# Patient Record
Sex: MALE | Race: WHITE | URBAN - METROPOLITAN AREA
[De-identification: names, ages, dates, MRNs, and addresses within clinical notes are randomized per-mention and may not be internally consistent; named-entity substitution may affect disease eponyms.]

---

## 2022-08-23 ENCOUNTER — OFFICE VISIT (OUTPATIENT)
Dept: PHYSICAL MEDICINE AND REHAB | Facility: CLINIC | Age: 52
End: 2022-08-23
Payer: COMMERCIAL

## 2022-08-23 ENCOUNTER — HOSPITAL ENCOUNTER (OUTPATIENT)
Dept: GENERAL RADIOLOGY | Facility: HOSPITAL | Age: 52
Discharge: HOME | End: 2022-08-23
Attending: PHYSICAL MEDICINE & REHABILITATION
Payer: COMMERCIAL

## 2022-08-23 VITALS
DIASTOLIC BLOOD PRESSURE: 86 MMHG | OXYGEN SATURATION: 99 % | HEIGHT: 70 IN | HEART RATE: 81 BPM | BODY MASS INDEX: 27.2 KG/M2 | SYSTOLIC BLOOD PRESSURE: 142 MMHG | WEIGHT: 190 LBS

## 2022-08-23 DIAGNOSIS — M54.16 LUMBAR RADICULOPATHY, RIGHT: ICD-10-CM

## 2022-08-23 DIAGNOSIS — M54.16 LUMBAR RADICULOPATHY, RIGHT: Primary | ICD-10-CM

## 2022-08-23 PROBLEM — F41.8 DEPRESSION WITH ANXIETY: Status: ACTIVE | Noted: 2021-06-30

## 2022-08-23 PROBLEM — I10 ESSENTIAL HYPERTENSION, BENIGN: Status: ACTIVE | Noted: 2020-09-23

## 2022-08-23 PROBLEM — E78.2 MIXED HYPERLIPIDEMIA: Status: ACTIVE | Noted: 2020-09-23

## 2022-08-23 PROBLEM — B35.1 TOENAIL FUNGUS: Status: ACTIVE | Noted: 2020-01-29

## 2022-08-23 PROCEDURE — 3077F SYST BP >= 140 MM HG: CPT | Performed by: PHYSICAL MEDICINE & REHABILITATION

## 2022-08-23 PROCEDURE — 3079F DIAST BP 80-89 MM HG: CPT | Performed by: PHYSICAL MEDICINE & REHABILITATION

## 2022-08-23 PROCEDURE — 99204 OFFICE O/P NEW MOD 45 MIN: CPT | Performed by: PHYSICAL MEDICINE & REHABILITATION

## 2022-08-23 PROCEDURE — 72110 X-RAY EXAM L-2 SPINE 4/>VWS: CPT | Performed by: PHYSICAL MEDICINE & REHABILITATION

## 2022-08-23 PROCEDURE — 3008F BODY MASS INDEX DOCD: CPT | Performed by: PHYSICAL MEDICINE & REHABILITATION

## 2022-08-23 RX ORDER — GABAPENTIN 300 MG/1
300 CAPSULE ORAL 3 TIMES DAILY
Qty: 90 CAPSULE | Refills: 1 | Status: SHIPPED | OUTPATIENT
Start: 2022-08-23 | End: 2022-10-22

## 2022-08-23 RX ORDER — FLUOXETINE HYDROCHLORIDE 20 MG/1
20 CAPSULE ORAL DAILY
COMMUNITY
Start: 2021-05-14

## 2022-08-23 NOTE — PATIENT INSTRUCTIONS
1. Start gabapentin 300mg at night, if tolerated increase to twice a day dosing after 2 days. Then increase to three times a day dosing after 2 days. 2. Obtain Xray of your low back today. 3. I have ordered an MRI of your low back to be completed. We will have a video health visit after your MRI to discuss our plan.

## 2022-08-29 ENCOUNTER — TELEPHONE (OUTPATIENT)
Dept: ADMINISTRATIVE | Age: 52
End: 2022-08-29

## 2022-09-01 ENCOUNTER — HOSPITAL ENCOUNTER (OUTPATIENT)
Dept: MRI IMAGING | Facility: HOSPITAL | Age: 52
Discharge: HOME | End: 2022-09-01
Attending: PHYSICAL MEDICINE & REHABILITATION
Payer: COMMERCIAL

## 2022-09-01 DIAGNOSIS — M54.16 LUMBAR RADICULOPATHY, RIGHT: ICD-10-CM

## 2022-09-01 PROCEDURE — 72148 MRI LUMBAR SPINE W/O DYE: CPT | Performed by: PHYSICAL MEDICINE & REHABILITATION

## 2022-09-06 ENCOUNTER — TELEPHONE (OUTPATIENT)
Dept: NEUROLOGY | Facility: CLINIC | Age: 52
End: 2022-09-06

## 2022-09-06 ENCOUNTER — TELEMEDICINE (OUTPATIENT)
Dept: PHYSICAL MEDICINE AND REHAB | Facility: CLINIC | Age: 52
End: 2022-09-06
Payer: COMMERCIAL

## 2022-09-06 DIAGNOSIS — M54.16 LUMBAR RADICULOPATHY, ACUTE: Primary | ICD-10-CM

## 2022-09-06 PROCEDURE — 99214 OFFICE O/P EST MOD 30 MIN: CPT | Performed by: PHYSICAL MEDICINE & REHABILITATION

## 2022-09-06 NOTE — PATIENT INSTRUCTIONS
1. We will schedule you for a right sided L5 transforaminal epidural steroid injection with fluoroscopic guidance. 2. Increase your gabapentin as tolerated, starting with 2 capsules at night, and eventually 2 capsules three times a day if tolerated.

## 2022-09-06 NOTE — TELEPHONE ENCOUNTER
[employer] for authorization of Right L5/S1 transforaminal epidural steroid injection with fluoroscopic guidance cpt codes 70105-W, 25696-ID, 01531. Daren Dudley Authorization #  [id number] valid 09/19/2022 - 10/18/2022. Will inform  Nursing.

## 2022-09-12 NOTE — TELEPHONE ENCOUNTER
Patient has been scheduled for Right L5/S1 transforaminal epidural steroid injection on 9/19/22 at the [facility] with [address].   -Anesthesia type: LOCAL  -If scheduling [address] covid testing required for all procedures whether patient is vaccinated or not. -Patient informed not to eat or drink anything after midnight the night prior to the procedure, if being sedated. -Patient was advised that if he/she does receive the covid vaccine it needs to be at least 2 weeks before or after the injection. -Medications and allergies reviewed. -Patient reminded to hold NSAIDs (Ibuprofen, ASA 81, Aleve, Naproxen, Mobic, Diclofenac, Etodolac, Celebrex etc.) for 3 days prior to [facility]  if BMI is greater than 35. For Cervical injections only hold multivitamins, Vitamin E, Fish Oil, Phentermine (Lomaira) for 7 days prior to injection and NSAIDS.  mg to be held for 7 days prior to injections.  -If patient is receiving MAC/IVCS Phentermine Benjamen Herd) will need to be held for 7 days prior to injection.  -If on blood thinner clearance has been received to hold this medication by provider.   -Patient informed he/she will need a  to and from procedure. -[facility] is located in the [facility] 1st floor. Patient may park in the yellow parking. Patient verbalized understanding and agrees with plan.  -----> Scheduled in Epic: Yes  -----> Scheduled in [employer]:  Yes

## 2022-09-19 ENCOUNTER — OFFICE VISIT (OUTPATIENT)
Dept: SURGERY | Facility: CLINIC | Age: 52
End: 2022-09-19

## 2022-09-19 DIAGNOSIS — M54.16 LUMBAR RADICULOPATHY, RIGHT: Primary | ICD-10-CM

## 2022-09-19 NOTE — PROGRESS NOTES
[facility]    LUMBAR TRANSFORAMINAL   NAME:  Christy Navarro    MR #:    [id number] :  6/10/1970     PHYSICIAN:  Breezy Land DO        Operative Report    DATE OF PROCEDURE: 2022   PREOPERATIVE DIAGNOSES: 1. Lumbar radiculopathy, right        POSTOPERATIVE DIAGNOSES:   1. Lumbar radiculopathy, right        PROCEDURES: right L5 transforaminal epidural steroid injection done under fluoroscopic guidance with contrast enhancement. SURGEON: Breezy Land DO   ANESTHESIA: Local   INDICATIONS:      OPERATIVE PROCEDURE:  Written consent was obtained from the patient. The patient was brought into the operating room and placed in the prone position on the fluoroscopy table with pillow underneath his abdomen. The patient's skin was cleaned and draped in a normal sterile fashion. Using AP fluoroscopy, all five lumbar vertebrae were identified. When the fifth vertebra was identified, fluoroscopy was left anterior obliqued opening up the right L5-S1 intervertebral foramen. At this point in time, the patient's skin was anesthetized with 1% PF lidocaine without epinephrine. Then, a 5 inch, 22-gauge spinal needle was inserted and directed towards the right L5-S1 intervertebral foramen. When it felt to be in good position, AP fluoroscopy was used to advance the needle to the 6 o'clock position on the right L5 pedicle. At this point in time, Omnipaque-240 contrast was used to obtain a good epidurogram indicating correct needle placement. Then, aspiration was performed. No blood, fluid, or air was aspirated. Then, the patient was injected with a 2 cc solution of 1 cc of 10mg/cc of Dexamethasone  and 1 cc of 1% PF lidocaine without epinephrine. After this, the needle was removed. The patient's skin was cleaned. A Band-Aid was applied. The patient was transferred to the cart and into [facility]. The patient was given discharge instructions and will follow up in the clinic as scheduled. Throughout the whole procedure, the patient's pulse oximetry and vital signs were monitored and they remained completely stable. Also, throughout the whole procedure, prior to injection of any medication, aspiration was performed. No blood, fluid, or air was aspirated at anytime.         Richard Hammond DO  Physical Medicine and Rehabilitation / [facility]

## 2022-10-03 ENCOUNTER — OFFICE VISIT (OUTPATIENT)
Dept: PHYSICAL MEDICINE AND REHAB | Facility: CLINIC | Age: 52
End: 2022-10-03
Payer: COMMERCIAL

## 2022-10-03 VITALS
BODY MASS INDEX: 27.01 KG/M2 | HEIGHT: 70 IN | DIASTOLIC BLOOD PRESSURE: 88 MMHG | HEART RATE: 85 BPM | SYSTOLIC BLOOD PRESSURE: 136 MMHG | WEIGHT: 188.69 LBS | OXYGEN SATURATION: 98 %

## 2022-10-03 DIAGNOSIS — M54.16 LUMBAR RADICULOPATHY, RIGHT: ICD-10-CM

## 2022-10-03 DIAGNOSIS — M54.16 LUMBAR RADICULOPATHY, ACUTE: Primary | ICD-10-CM

## 2022-10-03 NOTE — PROGRESS NOTES
RETURN PATIENT VISIT    CHIEF COMPLAINT  Low back pain with injection follow up     Patient presents with: Follow - Up: LOV: 9/6/22. INJ: 9/19/22 RIGHT L5 TFESI. Reports 60% relief. States symptoms originally radiating to RLE has significantly decreased. Denies n/t. Taking Gabapentin. Pain 2/10. INTERVAL HISTORY  Clovis Andrade is a 50year old who was last seen in clinic on 9/6/2022, he was a scheduled for right L5 transforaminal epidural steroid injection which was completed on 9/19/2022. He presents today for follow-up from this injection. He endorses approximately 60% relief in his symptoms with improvement every day. He now states that most of the pain down his leg has decreased, he is able to sleep drive for longer periods of time and rise from a seated position without significant pain. He does still have minor aches sharp pains in his low back as well as the right-sided buttock however these are significantly improved from before the injection. REVIEW OF SYSTEMS  Review of systems was completed with the patient today as pertinent to today's visit    PHYSICAL EXAMINATION  CONSTITUTIONAL: Well-appearing, in no apparent distress  EYES: No scleral icterus or conjunctival hemorrhage  CARDIOVASCULAR: Skin warm and well-perfused, no peripheral edema  RESPIRATORY: Breathing unlabored without accessory muscle use  PSYCHIATRIC: Alert, cooperative, appropriate mood and affect  SKIN: No lesions or rashes on exposed skin  MUSCULOSKELETAL: 5-5 strength in bilateral lower extremities, sensation grossly intact light touch. No significant tenderness to palpation of the lumbar paraspinal musculature bilateral gluteal musculature  NEUROLOGIC: Seated straight leg raise and slump sit are negative. Hip and sacroiliac joint provocative maneuvers remain negative.     IMPRESSION/DIAGNOSIS  Lumbar radiculopathy, acute  (primary encounter diagnosis)  Lumbar radiculopathy, right    Patient had a great response to transforaminal epidural steroid injection    TREATMENT/PLAN  Patient will continue with his current exercise program as well as physical therapy. He will continue his current prescription medications. He is on track for his goal of returning to tennis play within the next 4 to 6 weeks. If his pain persists would consider other either myofascial or repeat epidural steroid injections depending on his complaints of follow-up. He will follow-up with me as needed in the future. Education was provided regarding the above impression/diagnosis and treatment options/plan were discussed. All questions were answered during today's visit. Patient will contact clinic if any other questions or concerns.     Luly Vallejo DO  Physical Medicine and Rehabilitation / [facility]

## 2022-10-18 DIAGNOSIS — M54.16 LUMBAR RADICULOPATHY, RIGHT: ICD-10-CM

## 2022-10-18 RX ORDER — GABAPENTIN 300 MG/1
300 CAPSULE ORAL 3 TIMES DAILY
Qty: 90 CAPSULE | Refills: 3 | Status: SHIPPED | OUTPATIENT
Start: 2022-10-18

## 2022-10-18 NOTE — TELEPHONE ENCOUNTER
Refill Request    Medication request: gabapentin 300 MG Oral Cap. Take 1 capsule (300 mg total) by mouth 3 (three) times daily. LOV:10/3/2022 Rylee Mccurdy DO   Due back to clinic per last office note: Per Dr. Adrienne Nelson: [facility] will follow-up with me as needed in the future. \"  NOV: Visit date not found      [place]: 09/18/2022 #90 - No refills remaining    Urine drug screen (if applicable): n/a  Pain contract: none    LOV plan (if weaning or changing medications): Per Dr. Adrienne Nelson: [facility] will continue his current prescription medications. \"

## 2024-05-30 ENCOUNTER — OFFICE VISIT (OUTPATIENT)
Dept: PHYSICAL MEDICINE AND REHAB | Facility: CLINIC | Age: 54
End: 2024-05-30

## 2024-05-30 ENCOUNTER — TELEPHONE (OUTPATIENT)
Dept: PHYSICAL MEDICINE AND REHAB | Facility: CLINIC | Age: 54
End: 2024-05-30

## 2024-05-30 VITALS — BODY MASS INDEX: 29.12 KG/M2 | WEIGHT: 208 LBS | HEIGHT: 71 IN

## 2024-05-30 DIAGNOSIS — M54.16 LUMBAR RADICULOPATHY: Primary | ICD-10-CM

## 2024-05-30 PROCEDURE — 99214 OFFICE O/P EST MOD 30 MIN: CPT | Performed by: PHYSICAL MEDICINE & REHABILITATION

## 2024-05-30 PROCEDURE — 3008F BODY MASS INDEX DOCD: CPT | Performed by: PHYSICAL MEDICINE & REHABILITATION

## 2024-05-30 RX ORDER — METHYLPREDNISOLONE 4 MG/1
TABLET ORAL
Qty: 1 EACH | Refills: 0 | Status: SHIPPED | OUTPATIENT
Start: 2024-05-30

## 2024-05-30 RX ORDER — ROSUVASTATIN CALCIUM 10 MG/1
10 TABLET, COATED ORAL DAILY
COMMUNITY
Start: 2024-03-12

## 2024-05-30 NOTE — PROGRESS NOTES
RETURN PATIENT VISIT    CHIEF COMPLAINT  Low back pain, with right leg radicular features.     INTERVAL HISTORY  Laith Smith is a 53 year old who was last seen in clinic on 10/3/22. He states that he had return of his symptoms about a month ago, was packing a suitcase and noted return of symptoms while bending forward. States his pain in the low back and right buttock with pain in the right posterolateral right leg.  He states that his pain has been pretty severe, limiting his function, he is unable to find a comfortable position.  Previously he was able to walk and alleviate his pain however at this time walking, sitting, standing and sleeping bothers him.    Medications: Ibuprofen (mild help)     No red flags, does interrupt his sleep.     Chief Complaint   Patient presents with    Low Back Pain     LOV: 10/3/2022 pt comes in with R side low back burning/stabbing pain that radiates down posterior R leg into R foot. Intermittent T/N. Denies weakness. Rates pain 4/10. Symptoms started 4 weeks ago after packing a suitcase. Takes ibuprofen 600mg BID. Denies imaging. States he was doing great from injection done 9/2022 up until re-injuring his back.       REVIEW OF SYSTEMS  Review of systems was completed with the patient today as pertinent to today's visit    PHYSICAL EXAMINATION  CONSTITUTIONAL: Well-appearing, in no apparent distress  EYES: No scleral icterus or conjunctival hemorrhage  CARDIOVASCULAR: Skin warm and well-perfused, no peripheral edema  RESPIRATORY: Breathing unlabored without accessory muscle use  PSYCHIATRIC: Alert, cooperative, appropriate mood and affect  SKIN: No lesions or rashes on exposed skin  MUSCULOSKELETAL: Range of motion lumbar spine tension, nonantalgic gait.  Restricted range of motion in forward flexion with mild exacerbation of myofascial stretch in the axial low back and hamstrings.  NEUROLOGIC: Strength intact, slightly pain with single leg plantar flexion on the right. S1  reflex 1+ on the right, 2+ on theleft. Slump sit on the right is + for radicular pain down the posterolateral aspect of the right leg.       REVIEW OF PRIOR X-RAYS/STUDIES  Independent review of the MRI of the lumbar spine dated 9/1/2022 reveals broad-based disc bulging L5-S1 with severe stenosis right side and moderate left neuroforaminal stenosis.    IMPRESSION/DIAGNOSIS  1.    Encounter Diagnosis   Name Primary?    Lumbar radiculopathy Yes         TREATMENT/PLAN  Will prescribe a Medrol Dosepak with hope for short-term relief, patient appears to have exacerbation of radicular complaints into the right L5 and S1 dermatomal patterns.  Previously responded to transforaminal epidural steroid injections we will schedule the patient for right-sided L5 and S1 transforaminal epidural steroid injections with fluoroscopic guidance and local anesthesia.    Will hold on repeat MRI at this time.  If he does not respond as expected would repeat MRI.    Education was provided regarding the above impression/diagnosis and treatment options/plan were discussed.  All questions were answered during today's visit.  Patient will contact clinic if any other questions or concerns.          Dany Jenkins,   Interventional Spine and Sports Medicine Specialist   Physical Medicine and Rehabilitation  [place] [id number] [place] [id number]    [address] [place] [address]

## 2024-05-30 NOTE — TELEPHONE ENCOUNTER
Initiated authorization for Right L5 and S1 transforaminal epidural steroid injection, fluoroscopic guidance CPT 37189, 35988 dx:M54.16 to be done at [facility] with [facility]  Status: Approved w/ order ID #[id number] valid 6/4/24-7/3/24

## 2024-05-31 NOTE — TELEPHONE ENCOUNTER
Date was modified to start on 6/3/24 on [facility].    Patient has been scheduled for Right L5 and S1 transforaminal epidural steroid injection on 6/3/24 at the [facility] with Dr. Jenkins.   Anesthesia type:  Local  Please note: The [facility] will call the business day prior to discuss the exact time/arrival and additional instructions for your appointment.  Patient was advised that if he/she does receive the covid vaccine it needs to be at least 2 weeks before or after the injection.  Medications and allergies reviewed.  Educated to hold NSAIDS (Aleve, Ibuprofen, Motrin, Advil) and anti-inflammatories (Meloxicam, Naproxen, Diclofenac, Celebrex) and for cervical injections must hold Multi-Vitamins, Vitamin E, Fish Oil/Omega-3.  If patient is receiving MAC/IVCS, weight loss oral/injectable medications will need to be held for 7 days prior to injection.  Patient informed to fast 8 hours prior to procedure and 10-12 hours prior to procedure with IVCS/MAC if patient is on a weight loss medication.   If on blood thinner, clearance has been received and approved to hold this medication by provider.   Patient informed of [facility]'s  policy:  he/she will need a  to and from procedure and must be on site for their entirety of their visit, if their ride is unable to the procedure will be cancelled.   [facility] is located in the [facility] 1st floor [address] [place] [id number].   may park in the yellow/purple parking lot.  Patient verbalized understanding and agrees with plan.  Scheduled in Epic: Yes  Scheduled in Surgical Case: Yes  Follow up appoi  Message was sent to [facility] _ Vianney in regards to add on prior to cut off (12pm) scheduling time - message sent at 11:50am.

## (undated) NOTE — LETTER
[address]   Date:   8/23/2022     Name:   Eze Saul    YOB: 1970   MRN:   [id number]       WHERE IS YOUR PAIN NOW? Sonja the areas on your body where you feel the described sensations. Use the appropriate symbol. Larance Saltness the areas of radiation. Include all affected areas. Just to complete the picture, please draw in the face. ACHE:  ^ ^ ^   NUMBNESS:  0000   PINS & NEEDLES:  = = = =                              ^ ^ ^                       0000              = = = =                                    ^ ^ ^                       0000            = = = =      BURNING:  XXXX   STABBING: ////                  XXXX                ////                         XXXX          ////     Please sonja the line below indicating your degree of pain right now  with 0 being no pain 10 being the worst pain possible.                                          0             1             2              3             4              5              6              7             8             9             10         Patient Signature:

## (undated) NOTE — LETTER
[address]   Date:   10/3/2022     Name:   [facility] - RADHA MACKENZIE    YOB: 1970   MRN:   [id number]       [facility]? Sonja the areas on your body where you feel the described sensations. Use the appropriate symbol. Cherylene Bloodgood the areas of radiation. Include all affected areas. Just to complete the picture, please draw in the face. ACHE:  ^ ^ ^   NUMBNESS:  0000   PINS & NEEDLES:  = = = =                              ^ ^ ^                       0000              = = = =                                    ^ ^ ^                       0000            = = = =      BURNING:  XXXX   STABBING: ////                  XXXX                ////                         XXXX          ////     Please sonja the line below indicating your degree of pain right now  with 0 being no pain 10 being the worst pain possible.                                          0             1             2              3             4              5              6              7             8             9             10         Patient Signature: